# Patient Record
Sex: MALE | Race: WHITE | NOT HISPANIC OR LATINO | URBAN - METROPOLITAN AREA
[De-identification: names, ages, dates, MRNs, and addresses within clinical notes are randomized per-mention and may not be internally consistent; named-entity substitution may affect disease eponyms.]

---

## 2022-10-14 NOTE — ED ADULT NURSE NOTE - CHIEF COMPLAINT QUOTE
KELLEN from [address] [place] [address] train station for etoh intoxication. Patient is awake alert and responsive. Very upset in triage and crying. No signs of injury noted.
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
## 2022-10-15 ENCOUNTER — EMERGENCY (EMERGENCY)
Facility: HOSPITAL | Age: 42
LOS: 1 days | Discharge: HOME | End: 2022-10-15
Attending: EMERGENCY MEDICINE | Admitting: EMERGENCY MEDICINE
Payer: COMMERCIAL

## 2022-10-15 VITALS
RESPIRATION RATE: 20 BRPM | WEIGHT: 160.06 LBS | HEART RATE: 140 BPM | DIASTOLIC BLOOD PRESSURE: 100 MMHG | TEMPERATURE: 97 F | OXYGEN SATURATION: 98 % | SYSTOLIC BLOOD PRESSURE: 155 MMHG

## 2022-10-15 PROCEDURE — 99284 EMERGENCY DEPT VISIT MOD MDM: CPT

## 2022-10-15 PROCEDURE — 93010 ELECTROCARDIOGRAM REPORT: CPT

## 2022-10-15 RX ORDER — HALOPERIDOL DECANOATE 100 MG/ML
5 INJECTION INTRAMUSCULAR ONCE
Refills: 0 | Status: COMPLETED | OUTPATIENT
Start: 2022-10-15 | End: 2022-10-15

## 2022-10-15 RX ORDER — DIPHENHYDRAMINE HCL 50 MG
50 CAPSULE ORAL ONCE
Refills: 0 | Status: COMPLETED | OUTPATIENT
Start: 2022-10-15 | End: 2022-10-15

## 2022-10-15 RX ORDER — MIDAZOLAM HYDROCHLORIDE 1 MG/ML
2 INJECTION, SOLUTION INTRAMUSCULAR; INTRAVENOUS ONCE
Refills: 0 | Status: DISCONTINUED | OUTPATIENT
Start: 2022-10-15 | End: 2022-10-15

## 2022-10-15 RX ADMIN — MIDAZOLAM HYDROCHLORIDE 2 MILLIGRAM(S): 1 INJECTION, SOLUTION INTRAMUSCULAR; INTRAVENOUS at 21:32

## 2022-10-15 RX ADMIN — Medication 50 MILLIGRAM(S): at 21:31

## 2022-10-15 RX ADMIN — HALOPERIDOL DECANOATE 5 MILLIGRAM(S): 100 INJECTION INTRAMUSCULAR at 21:30

## 2022-10-15 NOTE — ED PROVIDER NOTE - PATIENT PORTAL LINK FT
You can access the FollowMyHealth Patient Portal offered by [facility] by registering at the following website: [email or web address]. By joining [facility]’s FollowMyHealth portal, you will also be able to view your health information using other applications (apps) compatible with our system.
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
## 2022-10-15 NOTE — ED PROVIDER NOTE - CLINICAL SUMMARY MEDICAL DECISION MAKING FREE TEXT BOX
Patient presenting with apparent severe alcohol and/or other drug intoxication patient was agitated about his rights being violated and was unable to be redirected.  Ultimately required IM sedation after he continued to escalate and was swatting at staff members who were trying to console him.

## 2022-10-15 NOTE — ED PROVIDER NOTE - NSFOLLOWUPINSTRUCTIONS_ED_ALL_ED_FT
You were given sedatives in the emergency department for agitation.  You may experience some grogginess during the day on Sunday.    Please get help for alcohol abuse if you drink heavily or use drugs on a regular basis.     [facility] is a good referral line for crisis and substance abuse help.  [facility] has drop in programs all over the [facility].    Return to the ER for Emergencies.     [facility]: [phone]   [facility] Substance Abuse Services: [phone], option #2   Methadone Maintenance & Ambulatory Opiate Detox: [phone]  [facility]: [phone]  [facility]: [phone]  [facility]: [phone]    [facility]: [phone], option #2   [facility]: [phone]    [facility]: [phone]    [facility] Central Intake: [phone]  [facility] Chemical Dependency/Ancillary Withdrawal: [phone]  [facility] Methadone Maintenance: [phone]    [facility]: [phone]  [facility] Addiction Treatment Services: [phone]    [facility]: 3-524-9-[facility] Office of Alcoholism and Substance Abuse Services (OASAS): https://www.oasas.[place].gov/providerdirectory/  [facility] for Addiction Services and Psychotherapy Interventions Research (CASPIR)  www.[facility].org     Interested in discussing options to reduce your tobacco use?    [facility] for Tobacco Control:  [phone]  [facility] QUITLINE: 7-312-JC-QUITS (459-0105)    Interested in learning more about substance use?      http://rethinkingdrinking.niaaa.nih.gov   https://www.drugabuse.gov/patients-families     Learn more about opioid overdose prevention programs in [place]:  http://www.health.[place].gov/diseases/aids/general/opioid_overdose_prevention/

## 2022-10-15 NOTE — ED PROVIDER NOTE - PHYSICAL EXAMINATION
Const: Severe intox, good hygiene, yelling and repeating that his rights were being violated. Sometimes swatting at staff.   ENT: Airway patent, protecting airway. Nasal mucosa clear. MMM. No scalp hematoma and no apparent c-spine tenderness.  Eyes: Clear bilaterally, pupils equal, round 3mm  Cardiac: Normal rate, regular rhythm.  Heart sounds S1, S2.  No murmurs, rubs or gallops.  Resp: Breath sounds clear and equal bilaterally.  GI: Abdomen soft, appears non-tender, no guarding.  MSK: No signs of acute trauma or injury.   Neuro: Severe intox, LYLES, normal tone, slurred speech, answers simple questions.  Skin: No signs of acute trauma or injury.   Psych, Severe intox, mostly uncooperative, many attempts to console, reassure and redirect unsuccessful.

## 2022-10-15 NOTE — ED PROVIDER NOTE - CARE PLAN
1 Principal Discharge DX:	Altered mental status  Secondary Diagnosis:	Agitation requiring sedation protocol

## 2022-10-15 NOTE — ED ADULT NURSE REASSESSMENT NOTE - NS ED NURSE REASSESS COMMENT FT1
patient continuously attempting to get up out of bed. MD German made aware. patent presenting altered and intoxicated; brought in by PD. requesting to go home at this time; unsafe discharge. patient ultimately chemically sedated; after multiple attempts to redirect and use of music therapy to calm patient down; placed on cardiac monitoring and pulse oximetry. HOB elevated. patient resting comfortably; pending reeval

## 2022-10-16 VITALS
HEART RATE: 88 BPM | OXYGEN SATURATION: 96 % | RESPIRATION RATE: 19 BRPM | DIASTOLIC BLOOD PRESSURE: 56 MMHG | SYSTOLIC BLOOD PRESSURE: 119 MMHG

## 2022-10-16 NOTE — ED ADULT NURSE REASSESSMENT NOTE - NS ED NURSE REASSESS COMMENT FT1
patient reassessed; Patient alert verbal oriented x3; ambulatory w/ steady gait. MD Villanueva made aware. pending dispo.
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
## 2022-10-17 DIAGNOSIS — R45.1 RESTLESSNESS AND AGITATION: ICD-10-CM

## 2022-10-17 DIAGNOSIS — F10.929 ALCOHOL USE, UNSPECIFIED WITH INTOXICATION, UNSPECIFIED: ICD-10-CM
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
## 2022-10-17 DIAGNOSIS — R41.82 ALTERED MENTAL STATUS, UNSPECIFIED: ICD-10-CM
